# Patient Record
Sex: MALE | Race: WHITE | Employment: FULL TIME | URBAN - METROPOLITAN AREA
[De-identification: names, ages, dates, MRNs, and addresses within clinical notes are randomized per-mention and may not be internally consistent; named-entity substitution may affect disease eponyms.]

---

## 2023-02-11 PROCEDURE — 99285 EMERGENCY DEPT VISIT HI MDM: CPT

## 2023-02-11 PROCEDURE — 96375 TX/PRO/DX INJ NEW DRUG ADDON: CPT

## 2023-02-11 PROCEDURE — 96361 HYDRATE IV INFUSION ADD-ON: CPT

## 2023-02-11 PROCEDURE — 96365 THER/PROPH/DIAG IV INF INIT: CPT

## 2023-02-12 ENCOUNTER — HOSPITAL ENCOUNTER (INPATIENT)
Age: 48
LOS: 1 days | Discharge: HOME | DRG: 446 | End: 2023-02-12
Attending: EMERGENCY MEDICINE | Admitting: SURGERY
Payer: COMMERCIAL

## 2023-02-12 ENCOUNTER — APPOINTMENT (OUTPATIENT)
Dept: GENERAL RADIOLOGY | Age: 48
DRG: 446 | End: 2023-02-12
Payer: COMMERCIAL

## 2023-02-12 ENCOUNTER — APPOINTMENT (OUTPATIENT)
Dept: CT IMAGING | Age: 48
DRG: 446 | End: 2023-02-12
Payer: COMMERCIAL

## 2023-02-12 ENCOUNTER — APPOINTMENT (OUTPATIENT)
Dept: ULTRASOUND IMAGING | Age: 48
DRG: 446 | End: 2023-02-12
Payer: COMMERCIAL

## 2023-02-12 VITALS
BODY MASS INDEX: 25.9 KG/M2 | WEIGHT: 185 LBS | SYSTOLIC BLOOD PRESSURE: 107 MMHG | HEART RATE: 113 BPM | HEIGHT: 71 IN | DIASTOLIC BLOOD PRESSURE: 67 MMHG | RESPIRATION RATE: 18 BRPM | TEMPERATURE: 98.6 F | OXYGEN SATURATION: 98 %

## 2023-02-12 DIAGNOSIS — K81.0 ACUTE CHOLECYSTITIS: Primary | ICD-10-CM

## 2023-02-12 PROBLEM — K81.9 CHOLECYSTITIS: Status: ACTIVE | Noted: 2023-02-12

## 2023-02-12 LAB
ALBUMIN SERPL-MCNC: 4.5 G/DL (ref 3.5–5.2)
ALP BLD-CCNC: 72 U/L (ref 40–129)
ALT SERPL-CCNC: 41 U/L (ref 0–40)
ANION GAP SERPL CALCULATED.3IONS-SCNC: 14 MMOL/L (ref 7–16)
AST SERPL-CCNC: 38 U/L (ref 0–39)
BASOPHILS ABSOLUTE: 0.05 E9/L (ref 0–0.2)
BASOPHILS RELATIVE PERCENT: 0.3 % (ref 0–2)
BILIRUB SERPL-MCNC: 0.9 MG/DL (ref 0–1.2)
BUN BLDV-MCNC: 16 MG/DL (ref 6–20)
CALCIUM SERPL-MCNC: 9.4 MG/DL (ref 8.6–10.2)
CHLORIDE BLD-SCNC: 99 MMOL/L (ref 98–107)
CO2: 23 MMOL/L (ref 22–29)
CREAT SERPL-MCNC: 1.1 MG/DL (ref 0.7–1.2)
EOSINOPHILS ABSOLUTE: 0.11 E9/L (ref 0.05–0.5)
EOSINOPHILS RELATIVE PERCENT: 0.8 % (ref 0–6)
GFR SERPL CREATININE-BSD FRML MDRD: >60 ML/MIN/1.73
GLUCOSE BLD-MCNC: 133 MG/DL (ref 74–99)
HCT VFR BLD CALC: 40.4 % (ref 37–54)
HEMOGLOBIN: 14.4 G/DL (ref 12.5–16.5)
IMMATURE GRANULOCYTES #: 0.06 E9/L
IMMATURE GRANULOCYTES %: 0.4 % (ref 0–5)
LACTIC ACID: 2.7 MMOL/L (ref 0.5–2.2)
LACTIC ACID: 3.6 MMOL/L (ref 0.5–2.2)
LACTIC ACID: 4.1 MMOL/L (ref 0.5–2.2)
LIPASE: 44 U/L (ref 13–60)
LYMPHOCYTES ABSOLUTE: 2.42 E9/L (ref 1.5–4)
LYMPHOCYTES RELATIVE PERCENT: 16.5 % (ref 20–42)
MCH RBC QN AUTO: 30.6 PG (ref 26–35)
MCHC RBC AUTO-ENTMCNC: 35.6 % (ref 32–34.5)
MCV RBC AUTO: 86 FL (ref 80–99.9)
MONOCYTES ABSOLUTE: 1.09 E9/L (ref 0.1–0.95)
MONOCYTES RELATIVE PERCENT: 7.5 % (ref 2–12)
NEUTROPHILS ABSOLUTE: 10.9 E9/L (ref 1.8–7.3)
NEUTROPHILS RELATIVE PERCENT: 74.5 % (ref 43–80)
PDW BLD-RTO: 13 FL (ref 11.5–15)
PLATELET # BLD: 257 E9/L (ref 130–450)
PMV BLD AUTO: 10 FL (ref 7–12)
POTASSIUM REFLEX MAGNESIUM: 3.6 MMOL/L (ref 3.5–5)
RBC # BLD: 4.7 E12/L (ref 3.8–5.8)
SODIUM BLD-SCNC: 136 MMOL/L (ref 132–146)
TOTAL PROTEIN: 7.2 G/DL (ref 6.4–8.3)
TROPONIN, HIGH SENSITIVITY: 8 NG/L (ref 0–11)
TROPONIN, HIGH SENSITIVITY: <6 NG/L (ref 0–11)
WBC # BLD: 14.6 E9/L (ref 4.5–11.5)

## 2023-02-12 PROCEDURE — 71045 X-RAY EXAM CHEST 1 VIEW: CPT

## 2023-02-12 PROCEDURE — 36415 COLL VENOUS BLD VENIPUNCTURE: CPT

## 2023-02-12 PROCEDURE — 93005 ELECTROCARDIOGRAM TRACING: CPT | Performed by: STUDENT IN AN ORGANIZED HEALTH CARE EDUCATION/TRAINING PROGRAM

## 2023-02-12 PROCEDURE — 6360000002 HC RX W HCPCS: Performed by: STUDENT IN AN ORGANIZED HEALTH CARE EDUCATION/TRAINING PROGRAM

## 2023-02-12 PROCEDURE — A4216 STERILE WATER/SALINE, 10 ML: HCPCS | Performed by: STUDENT IN AN ORGANIZED HEALTH CARE EDUCATION/TRAINING PROGRAM

## 2023-02-12 PROCEDURE — 76705 ECHO EXAM OF ABDOMEN: CPT

## 2023-02-12 PROCEDURE — 6360000004 HC RX CONTRAST MEDICATION: Performed by: RADIOLOGY

## 2023-02-12 PROCEDURE — 84484 ASSAY OF TROPONIN QUANT: CPT

## 2023-02-12 PROCEDURE — 83605 ASSAY OF LACTIC ACID: CPT

## 2023-02-12 PROCEDURE — 2580000003 HC RX 258: Performed by: STUDENT IN AN ORGANIZED HEALTH CARE EDUCATION/TRAINING PROGRAM

## 2023-02-12 PROCEDURE — 1200000000 HC SEMI PRIVATE

## 2023-02-12 PROCEDURE — 74177 CT ABD & PELVIS W/CONTRAST: CPT

## 2023-02-12 PROCEDURE — 6370000000 HC RX 637 (ALT 250 FOR IP): Performed by: STUDENT IN AN ORGANIZED HEALTH CARE EDUCATION/TRAINING PROGRAM

## 2023-02-12 PROCEDURE — 83690 ASSAY OF LIPASE: CPT

## 2023-02-12 PROCEDURE — 80053 COMPREHEN METABOLIC PANEL: CPT

## 2023-02-12 PROCEDURE — 85025 COMPLETE CBC W/AUTO DIFF WBC: CPT

## 2023-02-12 PROCEDURE — 2500000003 HC RX 250 WO HCPCS: Performed by: STUDENT IN AN ORGANIZED HEALTH CARE EDUCATION/TRAINING PROGRAM

## 2023-02-12 RX ORDER — ONDANSETRON 2 MG/ML
4 INJECTION INTRAMUSCULAR; INTRAVENOUS EVERY 6 HOURS PRN
Status: DISCONTINUED | OUTPATIENT
Start: 2023-02-12 | End: 2023-02-12 | Stop reason: HOSPADM

## 2023-02-12 RX ORDER — ACETAMINOPHEN 325 MG/1
650 TABLET ORAL EVERY 4 HOURS PRN
Status: DISCONTINUED | OUTPATIENT
Start: 2023-02-12 | End: 2023-02-12 | Stop reason: HOSPADM

## 2023-02-12 RX ORDER — OXYCODONE HYDROCHLORIDE AND ACETAMINOPHEN 5; 325 MG/1; MG/1
1 TABLET ORAL EVERY 6 HOURS PRN
Status: DISCONTINUED | OUTPATIENT
Start: 2023-02-12 | End: 2023-02-12 | Stop reason: HOSPADM

## 2023-02-12 RX ORDER — SODIUM CHLORIDE, SODIUM LACTATE, POTASSIUM CHLORIDE, CALCIUM CHLORIDE 600; 310; 30; 20 MG/100ML; MG/100ML; MG/100ML; MG/100ML
INJECTION, SOLUTION INTRAVENOUS CONTINUOUS
Status: DISCONTINUED | OUTPATIENT
Start: 2023-02-12 | End: 2023-02-12 | Stop reason: HOSPADM

## 2023-02-12 RX ORDER — ONDANSETRON 2 MG/ML
4 INJECTION INTRAMUSCULAR; INTRAVENOUS ONCE
Status: COMPLETED | OUTPATIENT
Start: 2023-02-12 | End: 2023-02-12

## 2023-02-12 RX ORDER — ENOXAPARIN SODIUM 100 MG/ML
30 INJECTION SUBCUTANEOUS DAILY
Status: DISCONTINUED | OUTPATIENT
Start: 2023-02-13 | End: 2023-02-12 | Stop reason: DRUGHIGH

## 2023-02-12 RX ORDER — 0.9 % SODIUM CHLORIDE 0.9 %
1000 INTRAVENOUS SOLUTION INTRAVENOUS ONCE
Status: COMPLETED | OUTPATIENT
Start: 2023-02-12 | End: 2023-02-12

## 2023-02-12 RX ORDER — KETOROLAC TROMETHAMINE 30 MG/ML
15 INJECTION, SOLUTION INTRAMUSCULAR; INTRAVENOUS EVERY 6 HOURS PRN
Status: DISCONTINUED | OUTPATIENT
Start: 2023-02-12 | End: 2023-02-12 | Stop reason: HOSPADM

## 2023-02-12 RX ORDER — ENOXAPARIN SODIUM 100 MG/ML
40 INJECTION SUBCUTANEOUS EVERY 24 HOURS
Status: DISCONTINUED | OUTPATIENT
Start: 2023-02-12 | End: 2023-02-12 | Stop reason: HOSPADM

## 2023-02-12 RX ADMIN — FAMOTIDINE 20 MG: 10 INJECTION, SOLUTION INTRAVENOUS at 00:55

## 2023-02-12 RX ADMIN — IOPAMIDOL 75 ML: 755 INJECTION, SOLUTION INTRAVENOUS at 02:16

## 2023-02-12 RX ADMIN — SODIUM CHLORIDE 1000 ML: 9 INJECTION, SOLUTION INTRAVENOUS at 00:55

## 2023-02-12 RX ADMIN — SODIUM CHLORIDE 1000 ML: 9 INJECTION, SOLUTION INTRAVENOUS at 03:44

## 2023-02-12 RX ADMIN — PIPERACILLIN AND TAZOBACTAM 4500 MG: 4; .5 INJECTION, POWDER, FOR SOLUTION INTRAVENOUS at 03:44

## 2023-02-12 RX ADMIN — LIDOCAINE HYDROCHLORIDE: 20 SOLUTION ORAL; TOPICAL at 00:56

## 2023-02-12 RX ADMIN — ONDANSETRON 4 MG: 2 INJECTION INTRAMUSCULAR; INTRAVENOUS at 00:54

## 2023-02-12 ASSESSMENT — PAIN SCALES - GENERAL: PAINLEVEL_OUTOF10: 5

## 2023-02-12 ASSESSMENT — PAIN - FUNCTIONAL ASSESSMENT
PAIN_FUNCTIONAL_ASSESSMENT: NONE - DENIES PAIN
PAIN_FUNCTIONAL_ASSESSMENT: NONE - DENIES PAIN

## 2023-02-12 ASSESSMENT — LIFESTYLE VARIABLES
HOW MANY STANDARD DRINKS CONTAINING ALCOHOL DO YOU HAVE ON A TYPICAL DAY: PATIENT DOES NOT DRINK
HOW OFTEN DO YOU HAVE A DRINK CONTAINING ALCOHOL: NEVER

## 2023-02-12 ASSESSMENT — PAIN DESCRIPTION - DESCRIPTORS: DESCRIPTORS: BURNING

## 2023-02-12 ASSESSMENT — PAIN DESCRIPTION - LOCATION: LOCATION: CHEST;ABDOMEN

## 2023-02-12 NOTE — ED PROVIDER NOTES
6:30 AM EST  I received this patient at sign out from Dr. Man Ambrose. I have discussed the patient's initial exam, treatment and plan of care with the out going physician. I have introduced my self to the patient / family and have answered their questions to this point. I have examined the patient myself and reviewed ordered tests / medications and  reviewed any available results to this point. If a resident is involved in the Emergency Department care, I have discussed my findings and plan with them as well. Awaiting US.      Elroy Everett DO  02/12/23 1446

## 2023-02-12 NOTE — ED NOTES
Assumed care of patient at 7:30 Introduced self to patient as RN. Patient denies any complaints.  Awaiting to go to [facility] for test. Call light within reach     [facility]  02/12/23 5429

## 2023-02-12 NOTE — PROGRESS NOTES
RN called Dr. Yasmeen Stinson to report patient request to leave and have surgery outpatient after visiting his PCP.

## 2023-02-12 NOTE — PROGRESS NOTES
Dr. Chase Thompson MD,    Your patient is on a medication that requires a weight dose adjustment. Body Weight Function Assessment:    Date Body Weight IBW  Adjusted BW SCr  CrCl Dialysis status   2/12/2023 185 lb (83.9 kg)  Ideal body weight: 75.3 kg (166 lb 0.1 oz)  Adjusted ideal body weight: 78.7 kg (173 lb 9.7 oz) Serum creatinine: 1.1 mg/dL 02/12/23 0044  Estimated creatinine clearance: 88 mL/min N/A       Pharmacy has weight dose-adjusted the following medication(s):    Date Previous Order Adjusted Order   2/12/2023 Lovenox 30 mg Sub-Q daily Lovenox 40 mg Sub-Q daily       These changes were made per protocol according to the [facility] Weight Based Dose Adjustment Policy. *Please note this dose may need readjusted if your patient's condition changes. Please contact pharmacy with any questions regarding these changes.     Bruno Marcos, [facility]  2/12/2023  1:03 PM

## 2023-02-12 NOTE — ED PROVIDER NOTES
Faisal Santos [address] [place] [address]        Pt Name: Kamaljit Lamar  MRN: [id number]  [place] 1975  Date of evaluation: 2/11/2023  Provider: Modesta Stanley DO  PCP: No primary care provider on file. Note Started: 6:18 AM EST 2/12/23    CHIEF COMPLAINT       Chief Complaint   Patient presents with    Abdominal Pain       HISTORY OF PRESENT ILLNESS: 1 or more Elements   History From: Patient    Limitations to history : None    Kamaljit Lamar is a 52 y.o. male no significant past medical history. Patient presents with chief complaint abdominal pain. Patient states that he had sudden onset of epigastric abdominal pain. Patient describes the pain as a sharp aching sensation located in his epigastric region. He currently rates pain 10 out of 10. Patient also endorses nausea with nonbilious nonbloody vomiting. Patient states his symptoms are moderate in severity, since onset he denies any exacerbating relieving factors. Patient denies any similar episodes in the past.  Patient denies any fevers, chills, chest pain, cough, headache, lightheadedness, numbness or tingling. Nursing Notes were all reviewed and agreed with or any disagreements were addressed in the HPI. REVIEW OF SYSTEMS :           Positives and Pertinent negatives as per HPI. SURGICAL HISTORY   No past surgical history on file. CURRENTMEDICATIONS       There are no discharge medications for this patient. ALLERGIES     Patient has no known allergies. FAMILYHISTORY     No family history on file.      SOCIAL HISTORY          SCREENINGS        [place] Coma Scale  Eye Opening: Spontaneous  Best Verbal Response: Oriented  Best Motor Response: Obeys commands  Georges Coma Scale Score: 15                CIWA Assessment  BP: 107/67  Heart Rate: (!) 113           PHYSICAL EXAM  1 or more Elements     ED Triage Vitals [02/12/23 0000]   BP Temp Temp src Heart Rate Resp SpO2 Height Weight   110/78 98.6 °F (37 °C) -- 72 18 100 % 5' 11\" (1.803 m) 185 lb (83.9 kg)              Constitutional/General: Alert and oriented x3  Head: Normocephalic and atraumatic  Eyes: PERRL, EOMI, conjunctiva normal, sclera non icteric  ENT:  Oropharynx clear, handling secretions, no trismus, no asymmetry of the posterior oropharynx or uvular edema  Neck: Supple, full ROM, no stridor, no meningeal signs  Respiratory: Lungs clear to auscultation bilaterally, no wheezes, rales, or rhonchi. Not in respiratory distress  Cardiovascular:  Regular rate. Regular rhythm. No murmurs, no gallops, no rubs. 2+ distal pulses. Equal extremity pulses. Chest: No chest wall tenderness  GI: Abdomen soft with moderate epigastric abdominal pain no rigidity rebound or guarding  Musculoskeletal: Moves all extremities x 4. Warm and well perfused, no clubbing, no cyanosis, no edema. Capillary refill <3 seconds  Integument: skin warm and dry. No rashes.    Neurologic: GCS 15, no focal deficits, symmetric strength 5/5 in the upper and lower extremities bilaterally  Psychiatric: Normal Affect            DIAGNOSTIC RESULTS   LABS:    Labs Reviewed   CBC WITH AUTO DIFFERENTIAL - Abnormal; Notable for the following components:       Result Value    WBC 14.6 (*)     MCHC 35.6 (*)     Lymphocytes % 16.5 (*)     Neutrophils Absolute 10.90 (*)     Monocytes Absolute 1.09 (*)     All other components within normal limits   COMPREHENSIVE METABOLIC PANEL W/ REFLEX TO MG FOR LOW K - Abnormal; Notable for the following components:    Glucose 133 (*)     ALT 41 (*)     All other components within normal limits   LACTIC ACID - Abnormal; Notable for the following components:    Lactic Acid 3.6 (*)     All other components within normal limits    Narrative:     Ashia Rodriguez tel. [phone],  Chemistry results called to and read back by Travis Barone RN, 02/12/2023  01:26, by JOSE   LACTIC ACID - Abnormal; Notable for the following components:    Lactic Acid 4.1 (*)     All other components within normal limits    Narrative:     Justina Craig tel. [phone],  Chemistry results called to and read back by Autumn Méndez rn, 02/12/2023  03:23, by JOSE   LACTIC ACID - Abnormal; Notable for the following components:    Lactic Acid 2.7 (*)     All other components within normal limits   LIPASE   TROPONIN   TROPONIN       As interpreted by me, the above displayed labs are abnormal. All other labs obtained during this visit were within normal range or not returned as of this dictation. EKG Interpretation  Interpreted by emergency department physician, Shana Le DO      EKG #1:  Interpreted by emergency department physician unless otherwise noted. Time:  102    Rate: 65  Rhythm: Sinus. Interpretation: EKG obtained demonstrated normal sinus rhythm, rate 65, normal axis, QTc 472, no acute ST segment changes. .  Comparison: no previous EKG. RADIOLOGY:   Non-plain film images such as CT, Ultrasound and MRI are read by the radiologist. Plain radiographic images are visualized and preliminarily interpreted by the ED Provider with the below findings:    EKG CXR: No acute abnormalities    Interpretation per the Radiologist below, if available at the time of this note:    [address]   Final Result   Gallbladder contains echogenic foci with shadowing of cholelithiasis along   with intermixed minimal sludge and gallbladder wall thickening up to 4-5 mm   concerning for cholecystitis in the appropriate setting. Negative   sonographic Angel sign. No biliary dilatation. Diffusely echogenic liver parenchyma consistent with parenchymal disease   process such as hepatic steatosis. CT ABDOMEN PELVIS W IV CONTRAST Additional Contrast? None   Final Result   1. Minimal layering hyperdensity in the gallbladder suggestive of small   stones, though no CT findings to suggest definite cholecystitis. 2. No ileus, obstruction, or acute inflammatory bowel process.    3. Diverticulosis. Normal appendix. 4. Fatty liver infiltration. XR CHEST PORTABLE   Final Result   Mild vascular congestion, otherwise no acute process. CT ABDOMEN PELVIS W IV CONTRAST Additional Contrast? None    Result Date: 2/12/2023  EXAMINATION: CT OF THE ABDOMEN AND PELVIS WITH CONTRAST 2/12/2023 2:15 am TECHNIQUE: CT of the abdomen and pelvis was performed with the administration of intravenous contrast. Multiplanar reformatted images are provided for review. Automated exposure control, iterative reconstruction, and/or weight based adjustment of the mA/kV was utilized to reduce the radiation dose to as low as reasonably achievable. COMPARISON: None. HISTORY: ORDERING SYSTEM PROVIDED HISTORY: abd pain, elevated lactic TECHNOLOGIST PROVIDED HISTORY: Additional Contrast?->None Reason for exam:->abd pain, elevated lactic Decision Support Exception - unselect if not a suspected or confirmed emergency medical condition->Emergency Medical Condition (MA) FINDINGS: Lower Chest: No consolidation or pleural effusion is identified. No pericardial effusion is seen. No distal esophageal thickening. Organs: Fatty liver infiltration. No focal mass identified in the liver or spleen. No adrenal mass. No pancreatic mass. No peripancreatic inflammatory process. Minimal layering hyperdensity in the gallbladder suggestive of minimal cholelithiasis. No definite wall thickening or adjacent inflammatory change. Motion artifact limits evaluation. No enhancing renal mass is identified. No renal calculi, ureteral calculi or hydroureteronephrosis. GI/Bowel: No ileus or obstruction is identified. Normal appendix. Diverticulosis. No acute diverticulitis is identified. Pelvis: No pelvic mass. The bladder is unremarkable. No free fluid in the pelvis. No pelvic lymphadenopathy is identified. Peritoneum/Retroperitoneum: No aortic aneurysm. No dissection. No retroperitoneal or mesenteric lymphadenopathy.   No bowel herniation. Bones/Soft Tissues: No acute subcutaneous soft tissue abnormality. No acute osseous abnormality is identified. 1. Minimal layering hyperdensity in the gallbladder suggestive of small stones, though no CT findings to suggest definite cholecystitis. 2. No ileus, obstruction, or acute inflammatory bowel process. 3. Diverticulosis. Normal appendix. 4. Fatty liver infiltration. XR CHEST PORTABLE    Result Date: 2/12/2023  EXAMINATION: ONE XRAY VIEW OF THE CHEST 2/12/2023 1:19 am COMPARISON: None. HISTORY: ORDERING SYSTEM PROVIDED HISTORY: epigastric pain TECHNOLOGIST PROVIDED HISTORY: Reason for exam:->epigastric pain FINDINGS: Cardiac and mediastinal silhouettes appear within normal limits for size. Pulmonary vascularity is mildly prominent. No pneumothorax is seen. No acute osseous abnormality. No pleural effusion. Mild vascular congestion, otherwise no acute process. No results found. PROCEDURES   Unless otherwise noted below, none          PAST MEDICAL HISTORY/Chronic Conditions Affecting Care      has no past medical history on file.      EMERGENCY DEPARTMENT COURSE    Vitals:    Vitals:    02/12/23 0958 02/12/23 1046 02/12/23 1059 02/12/23 1113   BP: (!) 161/113   107/67   Pulse: (!) 120 90 (!) 112 (!) 113   Resp: 18  18 18   Temp:   98.6 °F (37 °C) 98.6 °F (37 °C)   SpO2: 99%  96% 98%   Weight:       Height:           Patient was given the following medications:  Medications   0.9 % sodium chloride bolus (0 mLs IntraVENous Stopped 2/12/23 0250)   ondansetron (ZOFRAN) injection 4 mg (4 mg IntraVENous Given 2/12/23 0054)   famotidine (PEPCID) 20 mg in sodium chloride (PF) 0.9 % 10 mL injection (20 mg IntraVENous Given 2/12/23 0055)   aluminum & magnesium hydroxide-simethicone (MAALOX) 30 mL, lidocaine viscous hcl (XYLOCAINE) 5 mL (GI COCKTAIL) ( Oral Given 2/12/23 0056)   iopamidol (ISOVUE-370) 76 % injection 75 mL (75 mLs IntraVENous Given 2/12/23 0216)   0.9 % sodium chloride bolus (0 mLs IntraVENous Stopped 2/12/23 0500)   piperacillin-tazobactam (ZOSYN) 4,500 mg in sodium chloride 0.9 % 100 mL IVPB (vial-mate) (0 mg IntraVENous Stopped 2/12/23 0400)           Is this patient to be included in the SEP-1 Core Measure due to severe sepsis or septic shock? No Exclusion criteria - the patient is NOT to be included for SEP-1 Core Measure due to: 2+ SIRS criteria are not met        Medical Decision Making/Differential Diagnosis:    CC/HPI Summary, Social Determinants of health, Records Reviewed, DDx, testing done/not done, ED Course, Reassessment, disposition considerations/shared decision making with patient, consults, disposition:      ED Course as of 02/12/23 1811   Sun Feb 12, 2023   0816 CBC reveals concerns for leukocytosis with a white count of 14.6. Metabolic panel reveals no electrolyte imbalances. Troponin is less than 6, symptoms less likely to be cardiac related or ACS. Patient lactic acid was initially 3.6, therefore was given a liter of fluids. Thereafter, patient is lactic acid was elevated again at 4.1, patient is given additional liter of fluids. Third lactic acid reveals downtrending levels at 2.7. Awaiting ultrasound at this time. Patient resting comfortably in bed and sound asleep. [AH]   [id number] Patient ultrasound reveals concern for cholecystitis, patient was given Zosyn here in the ED. Consult placed for general surgery. [AH]   4162 Spoke to Dr. Demetrius Mary, he will admit the patient [AH]      ED Course User Index  [AH] Neeru Lopez MD        History from : Patient    Limitations to history : None    Chronic Conditions: none    CONSULTS: (Who and What was discussed)  IP CONSULT TO GENERAL SURGERY    Discussion with Other Profesionals : Admitting Team Dr. Demetrius Mary    Social Determinants : None    Records Reviewed : None    CC/HPI Summary, DDx, ED Course, and Reassessment: Patient is a 80-year-old male no significant past medical history.   Patient presents chief complaint of abdominal pain. Vital signs stable presentation except for tachycardia. On physical exam heart tachycardic regular rhythm, lungs clear to auscultation bilaterally, abdomen soft with epigastric and right upper quadrant abdominal pain, no rigidity rebound or guarding. Differential diagnosis includes gastritis, gastroenteritis, colitis, acute cholecystitis. EKG obtained demonstrated no acute ischemic changes. Laboratory work obtained demonstrate leukocytosis of 14.6, CMP unremarkable, lipase 44, troponin was obtained was less than 6, lactic initially 3.6 did uptrend to 4.1 despite IV fluids. Chest x-ray obtained demonstrated no acute abnormalities. CT scan of the abdomen pelvis obtained with IV contrast demonstrated layering hyperdensity in the gallbladder suggestive of small stones no definitive findings of acute cholecystitis. Patient was given 1 L of IV fluids, GI cocktail, 20 mg of IV Pepcid. On repeat evaluation patient continues to have some abdominal pain repeat lactic did uptrend to 4.1 despite IV fluids. Given uptrending lactic as well as leukocytosis with abdominal pain felt pertinent to get right upper quadrant ultrasound prior to disposition. Patient was signed out to oncoming provider plan for disposition pending right upper quadrant ultrasound. Please see corresponding ED course as well as separate ED provider note for final plan of care. Disposition Considerations (Tests not ordered but considered, Shared Decision Making, Pt Expectation of Test or Tx.): Shared decision making discussed with patient. Patient continues to have epigastric abdominal pain. With uptrending lactic acid there is concern for possible acute cholecystitis. Feel pertinent to get right upper quadrant ultrasound prior to disposition pending patient was in agreement. FINAL IMPRESSION      1.  Acute cholecystitis          DISPOSITION/PLAN     DISPOSITION Admitted 02/12/2023 10:46:59 AM      PATIENT REFERRED TO:  No follow-up provider specified. DISCHARGE MEDICATIONS:  There are no discharge medications for this patient. DISCONTINUED MEDICATIONS:  There are no discharge medications for this patient.              (Please note that portions of this note were completed with a voice recognition program.  Efforts were made to edit the dictations but occasionally words are mis-transcribed.)    Warden Dylon DO (electronically signed)           James Sutton DO  Resident  02/12/23 0105

## 2023-02-12 NOTE — ED NOTES
Report faxed. [address] confirmed receipt.  Patient updated and chimed for transport     Jamaica Neves RN  02/12/23 3146

## 2023-02-12 NOTE — PROGRESS NOTES
Patient requesting to be discharged AMA. He states that he does not feel comfortable to proceed with surgery or be admitted until he visits his PCP. RN notified MD and provided education to patient. Patient still adamant to leave. AMA paperwork provided to patient. , signed by patient, witnessed and placed in soft chart,

## 2023-02-13 LAB
EKG ATRIAL RATE: 65 BPM
EKG Q-T INTERVAL: 454 MS
EKG QRS DURATION: 94 MS
EKG QTC CALCULATION (BAZETT): 472 MS
EKG R AXIS: 22 DEGREES
EKG T AXIS: -4 DEGREES
EKG VENTRICULAR RATE: 65 BPM

## 2023-02-13 PROCEDURE — 93010 ELECTROCARDIOGRAM REPORT: CPT | Performed by: INTERNAL MEDICINE

## 2023-07-30 ENCOUNTER — HOSPITAL ENCOUNTER (EMERGENCY)
Age: 48
Discharge: HOME | End: 2023-07-30
Attending: EMERGENCY MEDICINE
Payer: COMMERCIAL

## 2023-07-30 VITALS
DIASTOLIC BLOOD PRESSURE: 98 MMHG | RESPIRATION RATE: 18 BRPM | SYSTOLIC BLOOD PRESSURE: 122 MMHG | OXYGEN SATURATION: 99 % | TEMPERATURE: 97.9 F | HEART RATE: 95 BPM

## 2023-07-30 DIAGNOSIS — I47.1 PAROXYSMAL SUPRAVENTRICULAR TACHYCARDIA (HCC): Primary | ICD-10-CM

## 2023-07-30 LAB
ANION GAP SERPL CALCULATED.3IONS-SCNC: 10 MMOL/L (ref 7–16)
BASOPHILS # BLD: 0.06 K/UL (ref 0–0.2)
BASOPHILS NFR BLD: 1 % (ref 0–2)
BUN SERPL-MCNC: 16 MG/DL (ref 6–20)
CALCIUM SERPL-MCNC: 9.2 MG/DL (ref 8.6–10.2)
CHLORIDE SERPL-SCNC: 104 MMOL/L (ref 98–107)
CO2 SERPL-SCNC: 23 MMOL/L (ref 22–29)
CREAT SERPL-MCNC: 1 MG/DL (ref 0.7–1.2)
EOSINOPHIL # BLD: 0.24 K/UL (ref 0.05–0.5)
EOSINOPHILS RELATIVE PERCENT: 3 % (ref 0–6)
ERYTHROCYTE [DISTWIDTH] IN BLOOD BY AUTOMATED COUNT: 13.1 % (ref 11.5–15)
GFR SERPL CREATININE-BSD FRML MDRD: >60 ML/MIN/1.73M2
GLUCOSE SERPL-MCNC: 109 MG/DL (ref 74–99)
HCT VFR BLD AUTO: 40.1 % (ref 37–54)
HGB BLD-MCNC: 14.1 G/DL (ref 12.5–16.5)
IMM GRANULOCYTES # BLD AUTO: 0.05 K/UL (ref 0–0.58)
IMM GRANULOCYTES NFR BLD: 1 % (ref 0–5)
LYMPHOCYTES NFR BLD: 2.92 K/UL (ref 1.5–4)
LYMPHOCYTES RELATIVE PERCENT: 32 % (ref 20–42)
MCH RBC QN AUTO: 30.4 PG (ref 26–35)
MCHC RBC AUTO-ENTMCNC: 35.2 G/DL (ref 32–34.5)
MCV RBC AUTO: 86.4 FL (ref 80–99.9)
MONOCYTES NFR BLD: 0.78 K/UL (ref 0.1–0.95)
MONOCYTES NFR BLD: 9 % (ref 2–12)
NEUTROPHILS NFR BLD: 56 % (ref 43–80)
NEUTS SEG NFR BLD: 5.17 K/UL (ref 1.8–7.3)
PLATELET # BLD AUTO: 266 K/UL (ref 130–450)
PMV BLD AUTO: 10.3 FL (ref 7–12)
POTASSIUM SERPL-SCNC: 3.6 MMOL/L (ref 3.5–5)
RBC # BLD AUTO: 4.64 M/UL (ref 3.8–5.8)
SODIUM SERPL-SCNC: 137 MMOL/L (ref 132–146)
WBC OTHER # BLD: 9.2 K/UL (ref 4.5–11.5)

## 2023-07-30 PROCEDURE — 80048 BASIC METABOLIC PNL TOTAL CA: CPT

## 2023-07-30 PROCEDURE — 85027 COMPLETE CBC AUTOMATED: CPT

## 2023-07-30 PROCEDURE — 2580000003 HC RX 258: Performed by: STUDENT IN AN ORGANIZED HEALTH CARE EDUCATION/TRAINING PROGRAM

## 2023-07-30 PROCEDURE — 99284 EMERGENCY DEPT VISIT MOD MDM: CPT

## 2023-07-30 RX ORDER — 0.9 % SODIUM CHLORIDE 0.9 %
1000 INTRAVENOUS SOLUTION INTRAVENOUS ONCE
Status: COMPLETED | OUTPATIENT
Start: 2023-07-30 | End: 2023-07-30

## 2023-07-30 RX ADMIN — SODIUM CHLORIDE 1000 ML: 9 INJECTION, SOLUTION INTRAVENOUS at 20:48

## 2023-07-31 LAB
EKG ATRIAL RATE: 99 BPM
EKG P AXIS: 29 DEGREES
EKG P-R INTERVAL: 144 MS
EKG Q-T INTERVAL: 348 MS
EKG QRS DURATION: 90 MS
EKG QTC CALCULATION (BAZETT): 446 MS
EKG R AXIS: 29 DEGREES
EKG T AXIS: 20 DEGREES
EKG VENTRICULAR RATE: 99 BPM

## 2023-07-31 NOTE — ED PROVIDER NOTES
counseling regarding the diagnosis and prognosis. Questions are answered at this time and they are agreeable with the plan.       --------------------------------- IMPRESSION AND DISPOSITION ---------------------------------    IMPRESSION  1. Paroxysmal supraventricular tachycardia ([address])        DISPOSITION  Disposition: Discharge to home  Patient condition is stable        NOTE: This report was transcribed using voice recognition software.  Every effort was made to ensure accuracy; however, inadvertent computerized transcription errors may be present          Lucien Gilman DO  Resident  07/30/23 4929